# Patient Record
Sex: FEMALE | Employment: OTHER | URBAN - METROPOLITAN AREA
[De-identification: names, ages, dates, MRNs, and addresses within clinical notes are randomized per-mention and may not be internally consistent; named-entity substitution may affect disease eponyms.]

---

## 2023-02-09 ENCOUNTER — TELEPHONE (OUTPATIENT)
Dept: CARDIOLOGY CLINIC | Age: 63
End: 2023-02-09

## 2023-02-09 NOTE — TELEPHONE ENCOUNTER
Notified patient that Dr Valentina Flores is not out in [facility] office again until April. Offered her to see him tomorrow at [facility] at 8 am. Follow ups can be scheduled in [facility]. She is agreeable to this.

## 2023-02-09 NOTE — TELEPHONE ENCOUNTER
New Patient Referral    Referring Provider Name:Self   Phone [address]  Fax Number:none  Address: [address] [place] [address]  [place]    Diagnosis/Reason for Floresita Boucher family hx    Cardiac Clearance? no    Cardiac Testing: yes    Date testing was completed?___2021________      Have records been requested? no    Preferred Language:_______english________    needed? (Yes/No)

## 2023-02-10 ENCOUNTER — OFFICE VISIT (OUTPATIENT)
Dept: CARDIOLOGY CLINIC | Age: 63
End: 2023-02-10

## 2023-02-10 VITALS
HEART RATE: 69 BPM | BODY MASS INDEX: 25.43 KG/M2 | HEIGHT: 67 IN | DIASTOLIC BLOOD PRESSURE: 80 MMHG | SYSTOLIC BLOOD PRESSURE: 120 MMHG | OXYGEN SATURATION: 98 % | WEIGHT: 162 LBS

## 2023-02-10 DIAGNOSIS — I10 PRIMARY HYPERTENSION: ICD-10-CM

## 2023-02-10 DIAGNOSIS — E78.2 MIXED HYPERLIPIDEMIA: ICD-10-CM

## 2023-02-10 DIAGNOSIS — R94.39 ABNORMAL STRESS ECG WITH TREADMILL: ICD-10-CM

## 2023-02-10 DIAGNOSIS — R07.89 BURNING CHEST PAIN: ICD-10-CM

## 2023-02-10 DIAGNOSIS — R00.0 RACING HEART BEAT: Primary | ICD-10-CM

## 2023-02-10 RX ORDER — LOSARTAN POTASSIUM 100 MG/1
100 TABLET ORAL DAILY
COMMUNITY
Start: 2023-01-14

## 2023-02-10 RX ORDER — ROSUVASTATIN CALCIUM 20 MG/1
10 TABLET, COATED ORAL NIGHTLY
COMMUNITY
Start: 2023-01-17

## 2023-02-10 RX ORDER — IBUPROFEN 200 MG
1 CAPSULE ORAL DAILY
COMMUNITY
Start: 2019-11-26

## 2023-02-10 RX ORDER — ASCORBIC ACID 125 MG
1 TABLET,CHEWABLE ORAL DAILY
COMMUNITY

## 2023-02-10 NOTE — LETTER
[place]  [address] [phone]  Phone: [phone]  Fax: [phone]    Darrin Norman MD    February 10, 2023     2008 [address] [facility]    Patient: Prachi Rodriguez   MR Number: [id number]   YOB: 1960   Date of Visit: 2/10/2023       Dear Carlito Osorio:    Thank you for referring Prachi Rodriguez to me for evaluation/treatment. Below are the relevant portions of my assessment and plan of care. [address]  Cardiac Consult     Referring Provider:  Carlito Osorio     Chief Complaint   Patient presents with    New Patient    Palpitations    Other     Family history of CAD         History of Present Illness:  70 y.o. female seen as a new patient for palpitations, abnormal stress test and chest pain. She has been having \"heart racing\". Tends to come and go without exacerbating or relieving factors lasting \"a few minutes\". At times she has associated chest burning. Mild. She was evaluated at [facility] in  with a GXT. It was abnormal with 1 mm ST depression. No other testing was done. She is very nervous about this as her father  of \"a massive heart attack\". Past Medical History:  HTN, Hyperlipidemia    Surgical History:  No prior surgeries    Social History:  Social History     Tobacco Use    Smoking status: Never    Smokeless tobacco: Never   Substance Use Topics    Alcohol use: Never        Family History:  Father  of MI in his 66's. Allergies:  Patient has no known allergies. Home Medications:  Prior to Visit Medications    Medication Sig Taking?  Authorizing Provider   rosuvastatin (CRESTOR) 20 MG tablet 10 mg nightly Yes Historical Provider, MD   losartan (COZAAR) 100 MG tablet 100 mg daily Yes Historical Provider, MD   calcium citrate (CALCITRATE) 950 (200 Ca) MG tablet Take 1 tablet by mouth daily Yes Historical Provider, MD Menaquinone-7 (VITAMIN K2) 100 MCG CAPS Take 1 capsule by mouth daily Yes Historical Provider, MD   vitamin D (CHOLECALCIFEROL) 25 MCG (1000 UT) TABS tablet Take 25 mcg by mouth daily Yes Historical Provider, MD       [x] Medications and dosages reviewed. ROS:  [x]Full ROS obtained and negative except as mentioned in HPI      PHYSICAL EXAMINATION:  Vitals:    02/10/23 0747   BP: 120/80   Site: Left Upper Arm   Position: Sitting   Cuff Size: Medium Adult   Pulse: 69   SpO2: 98%   Weight: 162 lb (73.5 kg)   Height: 5' 7\" (1.702 m)        · GENERAL: Well developed, well nourished, No acute distress  · NEUROLOGICAL: Alert and oriented  · PSYCH: Calm affect  · SKIN: Warm and dry, No visible rash,   · EYES: Pupils equal and round, Sclera non-icteric,   · HENT:  External ears and nose unremarkable, mucus membranes moist  · MUSCULOSKELETAL: Normal cephalic, neck supple  · CAROTID: Normal upstroke, no bruits  · CARDIAC: JVP normal, Normal PMI, regular rate and rhythm, normal S1S2, no murmur, rub, or gallop  · RESPIRATORY: Normal respiratory effort, clear to auscultation bilaterally  · EXTREMITIES: No edema  · GASTROINTESTINAL: normal bowel sounds, soft, non-tender, No hepatomegaly     ECHO 9/2021 Fauzia Josué  Conclusions     Left Ventricle:   - EF evaluated by visual assessment 55-65%. - Left ventricular diastolic function parameters are normal.       Right Ventricle:   - Normal size right ventricle. - Right ventricular systolic function is normal.       Mitral Valve:   - Mild mitral  valve regurgitation.     - No mitral valve stenosis. Aortic Valve:   - Trace aortic valve reguritation.     - There is no aortic valve stenosis. Tricuspid Valve:   - Trace tricuspid valve reguritation.     - No tricuspid valve stenosis. Pulmonic Valve:   - No significant pulmonic valve regurgitation is evident.      - There is no pulmonic valve stenosis    GXT MICHAEL 9/2021    the patient    exercised for a total of 9.12 minutes into stage 4 of the Will protocol     ST Changes:  1.0 mm   depression - horizontal in leads 2, 3, AVF and V5 and V6     Impression:    Positive stress test.  ECGs suggestive of ischemia. EKG:  NSR, Normal    ASSESSMENT:      Palpitations/Heart Racing:  Etiology unclear  Check MCOT    HTN:  /80 (Site: Left Upper Arm, Position: Sitting, Cuff Size: Medium Adult)   Pulse 69   Ht 5' 7\" (1.702 m)   Wt 162 lb (73.5 kg)   SpO2 98%   BMI 25.37 kg/m²   Controlled. Continue cozaar    Hyperlipidemia:  Fair control with LDL in 90's  Continue crestor  Check Coronary calcium score  If high will need more aggressive treatment    Chest Burning:  Occurs with tachycardia at times  MCOT planned as above  Prior GXT abnormal  Need stress ECHO      PLAN:  MCOT  Stress ECHO  Coronary Calcium score  F/u 2 months    Thank you for allowing me to participate in the care of this individual.      Charlene Paez M.D., [facility]            If you have questions, please do not hesitate to call me. I look forward to following Audrey Leon along with you.     Sincerely,      Zay Yarbrough MD

## 2023-02-10 NOTE — PROGRESS NOTES
[address]  Cardiac Consult     Referring Provider:  Kendy Bowers     Chief Complaint   Patient presents with    New Patient    Palpitations    Other     Family history of CAD         History of Present Illness:  70 y.o. female seen as a new patient for palpitations, abnormal stress test and chest pain. She has been having \"heart racing\". Tends to come and go without exacerbating or relieving factors lasting \"a few minutes\". At times she has associated chest burning. Mild. She was evaluated at [facility] in  with a GXT. It was abnormal with 1 mm ST depression. No other testing was done. She is very nervous about this as her father  of \"a massive heart attack\". Past Medical History:  HTN, Hyperlipidemia    Surgical History:  No prior surgeries    Social History:  Social History     Tobacco Use    Smoking status: Never    Smokeless tobacco: Never   Substance Use Topics    Alcohol use: Never        Family History:  Father  of MI in his 66's. Allergies:  Patient has no known allergies. Home Medications:  Prior to Visit Medications    Medication Sig Taking? Authorizing Provider   rosuvastatin (CRESTOR) 20 MG tablet 10 mg nightly Yes Historical Provider, MD   losartan (COZAAR) 100 MG tablet 100 mg daily Yes Historical Provider, MD   calcium citrate (CALCITRATE) 950 (200 Ca) MG tablet Take 1 tablet by mouth daily Yes Historical Provider, MD   Menaquinone-7 (VITAMIN K2) 100 MCG CAPS Take 1 capsule by mouth daily Yes Historical Provider, MD   vitamin D (CHOLECALCIFEROL) 25 MCG (1000 UT) TABS tablet Take 25 mcg by mouth daily Yes Historical Provider, MD       [x] Medications and dosages reviewed.     ROS:  [x]Full ROS obtained and negative except as mentioned in HPI      PHYSICAL EXAMINATION:  Vitals:    02/10/23 0747   BP: 120/80   Site: Left Upper Arm   Position: Sitting   Cuff Size: Medium Adult   Pulse: 69   SpO2: 98%   Weight: 162 lb (73.5 kg)   Height: 5' 7\" (1.702 m) GENERAL: Well developed, well nourished, No acute distress  NEUROLOGICAL: Alert and oriented  PSYCH: Calm affect  SKIN: Warm and dry, No visible rash,   EYES: Pupils equal and round, Sclera non-icteric,   HENT:  External ears and nose unremarkable, mucus membranes moist  MUSCULOSKELETAL: Normal cephalic, neck supple  CAROTID: Normal upstroke, no bruits  CARDIAC: JVP normal, Normal PMI, regular rate and rhythm, normal S1S2, no murmur, rub, or gallop  RESPIRATORY: Normal respiratory effort, clear to auscultation bilaterally  EXTREMITIES: No edema  GASTROINTESTINAL: normal bowel sounds, soft, non-tender, No hepatomegaly     ECHO 9/2021 Belynda Most  Conclusions     Left Ventricle:   - EF evaluated by visual assessment 55-65%. - Left ventricular diastolic function parameters are normal.       Right Ventricle:   - Normal size right ventricle. - Right ventricular systolic function is normal.       Mitral Valve:   - Mild mitral  valve regurgitation.     - No mitral valve stenosis. Aortic Valve:   - Trace aortic valve reguritation.     - There is no aortic valve stenosis. Tricuspid Valve:   - Trace tricuspid valve reguritation.     - No tricuspid valve stenosis. Pulmonic Valve:   - No significant pulmonic valve regurgitation is evident. - There is no pulmonic valve stenosis    GXT MICHAEL 9/2021    the patient    exercised for a total of 9.12 minutes into stage 4 of the Will protocol     ST Changes:  1.0 mm   depression - horizontal in leads 2, 3, AVF and V5 and V6     Impression:    Positive stress test.  ECGs suggestive of ischemia. EKG:  NSR, Normal    ASSESSMENT:      Palpitations/Heart Racing:  Etiology unclear  Check MCOT    HTN:  /80 (Site: Left Upper Arm, Position: Sitting, Cuff Size: Medium Adult)   Pulse 69   Ht 5' 7\" (1.702 m)   Wt 162 lb (73.5 kg)   SpO2 98%   BMI 25.37 kg/m²   Controlled.   Continue cozaar    Hyperlipidemia:  Fair control with LDL in 90's  Continue crestor  Check Coronary calcium score  If high will need more aggressive treatment    Chest Burning:  Occurs with tachycardia at times  MCOT planned as above  Prior GXT abnormal  Need stress ECHO      PLAN:  MCOT  Stress ECHO  Coronary Calcium score  F/u 2 months    Thank you for allowing me to participate in the care of this individual.      Naresh Rossi M.D., [facility]

## 2023-02-10 NOTE — PATIENT INSTRUCTIONS
Stress echo in [place]  30 day heart monitor  Cardiac calcium score [facility])  Follow up in April

## 2023-02-14 ENCOUNTER — PROCEDURE VISIT (OUTPATIENT)
Dept: CARDIOLOGY CLINIC | Age: 63
End: 2023-02-14
Payer: MEDICARE

## 2023-02-14 DIAGNOSIS — R07.89 BURNING CHEST PAIN: ICD-10-CM

## 2023-02-14 DIAGNOSIS — R94.39 ABNORMAL STRESS ECG WITH TREADMILL: ICD-10-CM

## 2023-02-14 LAB
LV EF: 70 %
LVEF MODALITY: NORMAL

## 2023-02-14 PROCEDURE — 93351 STRESS TTE COMPLETE: CPT | Performed by: INTERNAL MEDICINE

## 2023-02-14 PROCEDURE — 93320 DOPPLER ECHO COMPLETE: CPT | Performed by: INTERNAL MEDICINE

## 2023-02-14 PROCEDURE — 93325 DOPPLER ECHO COLOR FLOW MAPG: CPT | Performed by: INTERNAL MEDICINE

## 2023-02-15 ENCOUNTER — TELEPHONE (OUTPATIENT)
Dept: CARDIOLOGY CLINIC | Age: 63
End: 2023-02-15

## 2023-02-15 DIAGNOSIS — E78.2 MIXED HYPERLIPIDEMIA: Primary | ICD-10-CM

## 2023-02-15 RX ORDER — ROSUVASTATIN CALCIUM 20 MG/1
20 TABLET, COATED ORAL NIGHTLY
Qty: 90 TABLET | Refills: 4 | Status: SHIPPED | OUTPATIENT
Start: 2023-02-15

## 2023-02-15 NOTE — TELEPHONE ENCOUNTER
----- Message from Army Jane MD sent at 2/15/2023  9:46 AM EST -----  There is some calcium in her arteries. Stress ECHO suggest that this is not causing an issue at this time. However, we need to be very aggressive with lipid management. Increase crestor to 20. Recheck lipids 3 months. Goal LDL < 70.     1 [facility]

## 2023-02-15 NOTE — TELEPHONE ENCOUNTER
Discussed with patient. She verbalized understanding. Lab orders mailed to home address. Crestor 2 mg tab sent to [address] in [facility].

## 2023-03-21 ENCOUNTER — TELEPHONE (OUTPATIENT)
Dept: CARDIOLOGY CLINIC | Age: 63
End: 2023-03-21

## 2023-03-21 NOTE — TELEPHONE ENCOUNTER
Pt called to ask Rustam Kamara should the keep her appt on 04/04 since she have her results.   Please advise

## 2023-03-21 NOTE — TELEPHONE ENCOUNTER
----- Message from Miguel Graves RN sent at 3/20/2023  5:06 PM EDT -----  Per [facility]- Monitor looks fine. F/u as planned.

## 2023-04-04 ENCOUNTER — OFFICE VISIT (OUTPATIENT)
Dept: CARDIOLOGY CLINIC | Age: 63
End: 2023-04-04
Payer: MEDICARE

## 2023-04-04 VITALS
OXYGEN SATURATION: 98 % | WEIGHT: 165 LBS | HEART RATE: 68 BPM | HEIGHT: 67 IN | BODY MASS INDEX: 25.9 KG/M2 | SYSTOLIC BLOOD PRESSURE: 136 MMHG | DIASTOLIC BLOOD PRESSURE: 80 MMHG

## 2023-04-04 DIAGNOSIS — I10 PRIMARY HYPERTENSION: ICD-10-CM

## 2023-04-04 DIAGNOSIS — R00.0 RACING HEART BEAT: Primary | ICD-10-CM

## 2023-04-04 DIAGNOSIS — R07.89 BURNING CHEST PAIN: ICD-10-CM

## 2023-04-04 DIAGNOSIS — E78.2 MIXED HYPERLIPIDEMIA: ICD-10-CM

## 2023-04-04 PROCEDURE — 99214 OFFICE O/P EST MOD 30 MIN: CPT | Performed by: INTERNAL MEDICINE

## 2023-04-04 PROCEDURE — 1123F ACP DISCUSS/DSCN MKR DOCD: CPT | Performed by: INTERNAL MEDICINE

## 2023-04-04 PROCEDURE — G8427 DOCREV CUR MEDS BY ELIG CLIN: HCPCS | Performed by: INTERNAL MEDICINE

## 2023-04-04 PROCEDURE — 3079F DIAST BP 80-89 MM HG: CPT | Performed by: INTERNAL MEDICINE

## 2023-04-04 PROCEDURE — G8417 CALC BMI ABV UP PARAM F/U: HCPCS | Performed by: INTERNAL MEDICINE

## 2023-04-04 PROCEDURE — 3075F SYST BP GE 130 - 139MM HG: CPT | Performed by: INTERNAL MEDICINE

## 2023-04-04 PROCEDURE — 3017F COLORECTAL CA SCREEN DOC REV: CPT | Performed by: INTERNAL MEDICINE

## 2023-04-04 PROCEDURE — 1036F TOBACCO NON-USER: CPT | Performed by: INTERNAL MEDICINE

## 2023-04-04 PROCEDURE — G8400 PT W/DXA NO RESULTS DOC: HCPCS | Performed by: INTERNAL MEDICINE

## 2023-04-04 PROCEDURE — 1090F PRES/ABSN URINE INCON ASSESS: CPT | Performed by: INTERNAL MEDICINE

## 2023-04-04 RX ORDER — LOSARTAN POTASSIUM 100 MG/1
100 TABLET ORAL DAILY
Qty: 90 TABLET | Refills: 4 | Status: SHIPPED | OUTPATIENT
Start: 2023-04-04

## 2023-04-04 RX ORDER — LEVOTHYROXINE SODIUM 0.05 MG/1
50 TABLET ORAL DAILY
COMMUNITY
Start: 2023-02-10

## 2023-04-04 NOTE — PROGRESS NOTES
obtained and negative except as mentioned in HPI      PHYSICAL EXAMINATION:  Vitals:    04/04/23 0832   BP: 136/80   Site: Left Upper Arm   Position: Sitting   Cuff Size: Medium Adult   Pulse: 68   SpO2: 98%   Weight: 165 lb (74.8 kg)   Height: 5' 7\" (1.702 m)        GENERAL: Well developed, well nourished, No acute distress  NEUROLOGICAL: Alert and oriented  PSYCH: Calm affect  SKIN: Warm and dry, No visible rash,   EYES: Pupils equal and round, Sclera non-icteric,   HENT:  External ears and nose unremarkable, mucus membranes moist  MUSCULOSKELETAL: Normal cephalic, neck supple  CAROTID: Normal upstroke, no bruits  CARDIAC: JVP normal, Normal PMI, regular rate and rhythm, normal S1S2, no murmur, rub, or gallop  RESPIRATORY: Normal respiratory effort, clear to auscultation bilaterally  EXTREMITIES: No edema  GASTROINTESTINAL: normal bowel sounds, soft, non-tender, No hepatomegaly     ECHO 9/2021 Moisés Kilgore  Conclusions     Left Ventricle:   - EF evaluated by visual assessment 55-65%. - Left ventricular diastolic function parameters are normal.       Right Ventricle:   - Normal size right ventricle. - Right ventricular systolic function is normal.       Mitral Valve:   - Mild mitral  valve regurgitation.     - No mitral valve stenosis. Aortic Valve:   - Trace aortic valve reguritation.     - There is no aortic valve stenosis. Tricuspid Valve:   - Trace tricuspid valve reguritation.     - No tricuspid valve stenosis. Pulmonic Valve:   - No significant pulmonic valve regurgitation is evident. - There is no pulmonic valve stenosis    GXT MICHAEL 9/2021    the patient    exercised for a total of 9.12 minutes into stage 4 of the Will protocol     ST Changes:  1.0 mm   depression - horizontal in leads 2, 3, AVF and V5 and V6     Impression:    Positive stress test.  ECGs suggestive of ischemia.      STRESS ECHO 2/2023   Summary   Left ventricular cavity size is normal.   Normal left ventricular wall

## 2023-05-01 LAB
ALT SERPL-CCNC: 19 IU/L (ref 10–35)
AST SERPL-CCNC: 23 IU/L (ref 10–35)
CHOLESTEROL, TOTAL: 170 MG/DL
HDLC SERPL-MCNC: 75 MG/DL
LDL CHOLESTEROL CALCULATED: 85 MG/DL
NONHDLC SERPL-MCNC: 95 MG/DL
TRIGL SERPL-MCNC: 52 MG/DL
TSH ULTRASENSITIVE: 2.02 (ref 0.27–4.2)

## 2023-05-02 ENCOUNTER — TELEPHONE (OUTPATIENT)
Dept: CARDIOLOGY CLINIC | Age: 63
End: 2023-05-02

## 2023-05-02 DIAGNOSIS — E78.2 MIXED HYPERLIPIDEMIA: Primary | ICD-10-CM

## 2023-05-02 RX ORDER — ROSUVASTATIN CALCIUM 40 MG/1
40 TABLET, COATED ORAL NIGHTLY
Qty: 90 TABLET | Refills: 4 | Status: SHIPPED | OUTPATIENT
Start: 2023-05-02

## 2023-05-02 NOTE — TELEPHONE ENCOUNTER
----- Message from Bobby Swan MD sent at 5/1/2023 12:56 PM EDT -----  Cholesterol still higher than I desire. I would like LDL < 70. Increase crestor to 40  Recheck 3 months.     1 [facility]

## 2023-05-02 NOTE — TELEPHONE ENCOUNTER
Spoke to patient. She verbalized understanding. Mailed lab orders to home address. Script sent to [facility] in [facility].

## 2023-08-07 LAB
ALT SERPL-CCNC: 23 IU/L (ref 10–40)
AST SERPL-CCNC: 22 IU/L (ref 10–40)
CHOLESTEROL, TOTAL: 179 MG/DL
HDLC SERPL-MCNC: 70 MG/DL
LDL CHOLESTEROL CALCULATED: 96 MG/DL
NONHDLC SERPL-MCNC: 109 MG/DL
TRIGL SERPL-MCNC: 65 MG/DL

## 2023-08-15 ENCOUNTER — TELEPHONE (OUTPATIENT)
Dept: CARDIOLOGY CLINIC | Age: 63
End: 2023-08-15

## 2023-08-15 DIAGNOSIS — E78.2 MIXED HYPERLIPIDEMIA: Primary | ICD-10-CM

## 2023-08-15 NOTE — TELEPHONE ENCOUNTER
----- Message from Mary Pederson MD sent at 8/14/2023  3:48 PM EDT -----  Lipids higher. Needs to take the crestor and watch diet. F/u 6 months with repeat lipids.     [facility]

## 2023-08-15 NOTE — TELEPHONE ENCOUNTER
Let message on patient's VM asking her to call office back to discuss lab results. Lab orders placed and patient on recall for Feb for Сергей Gardner.

## 2023-08-18 ENCOUNTER — TELEPHONE (OUTPATIENT)
Dept: CARDIOLOGY CLINIC | Age: 63
End: 2023-08-18

## 2023-08-18 NOTE — TELEPHONE ENCOUNTER
Pt asking if she can get any pamphlet mailed to her for foods to eat or avoid for cholesterol. Please call pt to advise.

## 2024-02-05 LAB
ALT SERPL-CCNC: 19 IU/L (ref 10–40)
AST SERPL-CCNC: 20 IU/L (ref 10–40)
CHOLESTEROL, TOTAL: 178 MG/DL
HDLC SERPL-MCNC: 82 MG/DL
LDL CHOLESTEROL CALCULATED: 84 MG/DL
NONHDLC SERPL-MCNC: 96 MG/DL
TRIGL SERPL-MCNC: 62 MG/DL

## 2024-02-06 ENCOUNTER — TELEPHONE (OUTPATIENT)
Dept: CARDIOLOGY CLINIC | Age: 64
End: 2024-02-06

## 2024-02-06 NOTE — TELEPHONE ENCOUNTER
Per [facility] - Cholesterol still a little higher than I would like. Goal LDL <70  We can discuss further at appointment.    I called and spoke to Yesenia and provided the above message and reminded her of the scheduled appointment on 2/20/24 at 9:30A.  She verbalized understanding and denied any questions and/or concerns.

## 2024-02-20 ENCOUNTER — OFFICE VISIT (OUTPATIENT)
Dept: CARDIOLOGY CLINIC | Age: 64
End: 2024-02-20
Payer: MEDICARE

## 2024-02-20 VITALS
DIASTOLIC BLOOD PRESSURE: 72 MMHG | BODY MASS INDEX: 24.33 KG/M2 | HEART RATE: 67 BPM | WEIGHT: 155 LBS | HEIGHT: 67 IN | OXYGEN SATURATION: 96 % | SYSTOLIC BLOOD PRESSURE: 118 MMHG

## 2024-02-20 DIAGNOSIS — E78.2 MIXED HYPERLIPIDEMIA: ICD-10-CM

## 2024-02-20 DIAGNOSIS — I10 PRIMARY HYPERTENSION: ICD-10-CM

## 2024-02-20 DIAGNOSIS — R00.2 PALPITATIONS: Primary | ICD-10-CM

## 2024-02-20 PROCEDURE — G8420 CALC BMI NORM PARAMETERS: HCPCS | Performed by: INTERNAL MEDICINE

## 2024-02-20 PROCEDURE — 99214 OFFICE O/P EST MOD 30 MIN: CPT | Performed by: INTERNAL MEDICINE

## 2024-02-20 PROCEDURE — G8400 PT W/DXA NO RESULTS DOC: HCPCS | Performed by: INTERNAL MEDICINE

## 2024-02-20 PROCEDURE — 1036F TOBACCO NON-USER: CPT | Performed by: INTERNAL MEDICINE

## 2024-02-20 PROCEDURE — 3074F SYST BP LT 130 MM HG: CPT | Performed by: INTERNAL MEDICINE

## 2024-02-20 PROCEDURE — 1123F ACP DISCUSS/DSCN MKR DOCD: CPT | Performed by: INTERNAL MEDICINE

## 2024-02-20 PROCEDURE — G8427 DOCREV CUR MEDS BY ELIG CLIN: HCPCS | Performed by: INTERNAL MEDICINE

## 2024-02-20 PROCEDURE — 3017F COLORECTAL CA SCREEN DOC REV: CPT | Performed by: INTERNAL MEDICINE

## 2024-02-20 PROCEDURE — 3078F DIAST BP <80 MM HG: CPT | Performed by: INTERNAL MEDICINE

## 2024-02-20 PROCEDURE — G8484 FLU IMMUNIZE NO ADMIN: HCPCS | Performed by: INTERNAL MEDICINE

## 2024-02-20 PROCEDURE — 1090F PRES/ABSN URINE INCON ASSESS: CPT | Performed by: INTERNAL MEDICINE

## 2024-02-20 RX ORDER — FAMOTIDINE 40 MG/1
40 TABLET, FILM COATED ORAL 2 TIMES DAILY
COMMUNITY
Start: 2024-02-17

## 2024-02-20 RX ORDER — EZETIMIBE 10 MG/1
10 TABLET ORAL DAILY
Qty: 90 TABLET | Refills: 4 | Status: SHIPPED | OUTPATIENT
Start: 2024-02-20

## 2024-02-20 RX ORDER — LOSARTAN POTASSIUM 100 MG/1
100 TABLET ORAL DAILY
Qty: 90 TABLET | Refills: 4 | Status: SHIPPED | OUTPATIENT
Start: 2024-02-20

## 2024-02-20 RX ORDER — DICYCLOMINE HYDROCHLORIDE 10 MG/1
10 CAPSULE ORAL 3 TIMES DAILY
COMMUNITY

## 2024-02-20 RX ORDER — ROSUVASTATIN CALCIUM 40 MG/1
40 TABLET, COATED ORAL NIGHTLY
Qty: 90 TABLET | Refills: 4 | Status: SHIPPED | OUTPATIENT
Start: 2024-02-20

## 2024-02-20 NOTE — PROGRESS NOTES
[facility]  Cardiac Consult     Referring Provider:  Favian Duckworth     Chief Complaint   Patient presents with    1 Year Follow Up    Hypertension    Hyperlipidemia        History of Present Illness:  64 y.o. female seen in f/u for palpitations, abnormal stress test and chest pain.    She was seen a few months ago with  \"heart racing\". Tended to come and go without exacerbating or relieving factors lasting \"a few minutes\". At times she has associated chest burning. Mild. She was evaluated at [facility] in  with a GXT. It was abnormal with 1 mm ST depression. No other testing was done. She is very nervous about this as her father  of \"a massive heart attack\".     Due to this she had a stress ECHO, MCOT and coronary calcium score. Stress ECHO normal. MCOT with 1% PAC and PVC, Coronary calcium score 55.     She is doing well. No chest pain. Active but not exercising. Trying to watch diet. Taking crestor 40 mg.      Past Medical History:  HTN, Hyperlipidemia    Surgical History:  No prior surgeries    Social History:  Social History     Tobacco Use    Smoking status: Never    Smokeless tobacco: Never   Substance Use Topics    Alcohol use: Never        Family History:  Father  of MI in his 70's.     Allergies:  Patient has no known allergies.     Home Medications:  Prior to Visit Medications    Medication Sig Taking? Authorizing Provider   famotidine (PEPCID) 40 MG tablet 1 tablet 2 times daily Yes Manuel Vaz MD   dicyclomine (BENTYL) 10 MG capsule Take 1 capsule by mouth 3 times daily Yes Manuel Vaz MD   rosuvastatin (CRESTOR) 40 MG tablet Take 1 tablet by mouth at bedtime Yes Anil Landa MD   losartan (COZAAR) 100 MG tablet Take 1 tablet by mouth daily Yes Anil Landa MD   levothyroxine (SYNTHROID) 50 MCG tablet 1 tablet Daily Yes Manuel Vaz MD   calcium citrate (CALCITRATE) 950 (200 Ca) MG tablet Take 1 tablet by mouth daily Yes Milind

## 2024-05-23 ENCOUNTER — TELEPHONE (OUTPATIENT)
Dept: CARDIOLOGY CLINIC | Age: 64
End: 2024-05-23

## 2024-05-23 LAB
ALT SERPL-CCNC: 27 IU/L (ref 10–40)
AST SERPL-CCNC: 26 IU/L (ref 10–40)
CHOLESTEROL, TOTAL: 134 MG/DL
HDLC SERPL-MCNC: 75 MG/DL
LDL CHOLESTEROL: 50 MG/DL
NONHDLC SERPL-MCNC: 59 MG/DL
TRIGL SERPL-MCNC: 44 MG/DL

## 2024-05-23 NOTE — TELEPHONE ENCOUNTER
----- Message from Anil Landa MD sent at 5/23/2024  2:52 PM EDT -----  Tell pt. their labs are good. F/u in clinic as planned.      [facility]

## 2025-02-20 ENCOUNTER — OFFICE VISIT (OUTPATIENT)
Dept: CARDIOLOGY CLINIC | Age: 65
End: 2025-02-20

## 2025-02-20 VITALS
OXYGEN SATURATION: 98 % | HEIGHT: 67 IN | DIASTOLIC BLOOD PRESSURE: 82 MMHG | SYSTOLIC BLOOD PRESSURE: 124 MMHG | HEART RATE: 75 BPM | BODY MASS INDEX: 25.74 KG/M2 | WEIGHT: 164 LBS

## 2025-02-20 DIAGNOSIS — E78.2 MIXED HYPERLIPIDEMIA: ICD-10-CM

## 2025-02-20 DIAGNOSIS — I10 PRIMARY HYPERTENSION: ICD-10-CM

## 2025-02-20 DIAGNOSIS — R00.2 PALPITATIONS: Primary | ICD-10-CM

## 2025-02-20 RX ORDER — LOSARTAN POTASSIUM 100 MG/1
100 TABLET ORAL DAILY
Qty: 90 TABLET | Refills: 4 | Status: SHIPPED | OUTPATIENT
Start: 2025-02-20

## 2025-02-20 RX ORDER — EZETIMIBE 10 MG/1
10 TABLET ORAL DAILY
Qty: 90 TABLET | Refills: 4 | Status: SHIPPED | OUTPATIENT
Start: 2025-02-20

## 2025-02-20 RX ORDER — ROSUVASTATIN CALCIUM 40 MG/1
40 TABLET, COATED ORAL NIGHTLY
Qty: 90 TABLET | Refills: 4 | Status: SHIPPED | OUTPATIENT
Start: 2025-02-20

## 2025-02-20 NOTE — PROGRESS NOTES
[facility]  Cardiac Consult     Referring Provider:  Favian Duckworth MD     Chief Complaint   Patient presents with    1 Year Follow Up    Hyperlipidemia    Hypertension        History of Present Illness:  65 y.o. female seen in f/u for palpitations, abnormal stress test and chest pain.    She was seen a few months ago with  \"heart racing\". Tended to come and go without exacerbating or relieving factors lasting \"a few minutes\". At times she has associated chest burning. Mild. She was evaluated at [facility] in  with a GXT. It was abnormal with 1 mm ST depression. No other testing was done. She is very nervous about this as her father  of \"a massive heart attack\".     Due to this she had a stress ECHO, MCOT and coronary calcium score. Stress ECHO normal. MCOT with 1% PAC and PVC, Coronary calcium score 55.     She is doing very well from a cardiac standpoint.  She has occasional palpitations.  No chest pain.  After last visit Zetia was added now she has excellent lipid control.  She had recent lipids with her PCP.    Past Medical History:  HTN, Hyperlipidemia    Surgical History:  No prior surgeries    Social History:  Social History     Tobacco Use    Smoking status: Never     Passive exposure: Never    Smokeless tobacco: Never   Substance Use Topics    Alcohol use: Never        Family History:  Father  of MI in his 70's.     Allergies:  Patient has no known allergies.     Home Medications:  Prior to Visit Medications    Medication Sig Taking? Authorizing Provider   losartan (COZAAR) 100 MG tablet Take 1 tablet by mouth daily Yes Anil Landa MD   rosuvastatin (CRESTOR) 40 MG tablet Take 1 tablet by mouth at bedtime Yes Anil Landa MD   ezetimibe (ZETIA) 10 MG tablet Take 1 tablet by mouth daily Yes Anil Landa MD   levothyroxine (SYNTHROID) 50 MCG tablet 1 tablet Daily Yes Provider, MD Manuel   calcium citrate (CALCITRATE) 950 (200 Ca) MG tablet Take 1 tablet by mouth